# Patient Record
Sex: FEMALE | Race: WHITE | NOT HISPANIC OR LATINO | Employment: FULL TIME | URBAN - METROPOLITAN AREA
[De-identification: names, ages, dates, MRNs, and addresses within clinical notes are randomized per-mention and may not be internally consistent; named-entity substitution may affect disease eponyms.]

---

## 2019-10-23 ENCOUNTER — OFFICE VISIT (OUTPATIENT)
Dept: DERMATOLOGY | Facility: CLINIC | Age: 29
End: 2019-10-23
Payer: COMMERCIAL

## 2019-10-23 VITALS — BODY MASS INDEX: 35.85 KG/M2 | WEIGHT: 228.4 LBS | TEMPERATURE: 98.3 F | HEIGHT: 67 IN

## 2019-10-23 DIAGNOSIS — B07.9 VERRUCA VULGARIS: ICD-10-CM

## 2019-10-23 DIAGNOSIS — L70.0 ACNE VULGARIS: Primary | ICD-10-CM

## 2019-10-23 PROCEDURE — 17110 DESTRUCTION B9 LES UP TO 14: CPT | Performed by: DERMATOLOGY

## 2019-10-23 PROCEDURE — 99202 OFFICE O/P NEW SF 15 MIN: CPT | Performed by: DERMATOLOGY

## 2019-10-23 RX ORDER — CLINDAMYCIN PHOSPHATE 11.9 MG/ML
SOLUTION TOPICAL
Qty: 60 ML | Refills: 6 | Status: SHIPPED | OUTPATIENT
Start: 2019-10-23

## 2019-10-23 NOTE — PROGRESS NOTES
[facility] Note     Patient Name: Leni Sicard  Encounter Date: 10/23/2019    Today's Chief Concerns:   Concern #1:  acne      Past Medical History:  Have you ever had or currently have any of the following medical conditions or treatments? · HIV/AIDS: No  · Hepatitis B: No  · Hepatitis C: No   · Diabetes: No  · Tuberculosis: No  · Biologic Therapy/Chemotherapy: No  · Organ or Bone Marrow Transplantation: No  · Radiation Treatment: No  · Cancer (If Yes, which types)- No      Have you ever had any of the following skin conditions? · Melanoma? (If Yes, please provide more detail)- No  · Basal Cell Carcinoma: No  · Squamous Cell Carcinoma: No  · Sebaceous Cell Carcinoma: No  · Merkel Cell Carcinoma: No  · Angiosarcoma: No  · Blistering Sunburns: No  · Eczema: No  · Psoriasis: No    Social History:    What is your current Smoking Status? Never a smoker    What is/was your primary occupation? What are your hobbies/past-times? Family history:  Do any of your "first degree relatives" (parent, brother, sister, or child) have any of the following conditions? · Melanoma? (If Yes, which relatives?) No  · Eczema: No  · Asthma: No  · Hay Fever/Seasonal Allergies: No  · Psoriasis: No  · Arthritis: No  · Thyroid Problems: No  · Lupus/Connective Tissue Disease: No  · Diabetes: No  · Stroke: No  · Blood Clots: No  · IBD/Crohn's/Ulcerative Colitis: No  · Vitiligo: No  · Scarring/Keloids: No  · Severe Acne: No  · Pancreatic Cancer: No  · Other known Skin Condition? If Yes, what condition and which relatives? No    Current Medications:  No current outpatient medications on file  Specific Alerts:    Have you been seen by a [facility] Dermatologist in the last 3 years? No    Are you pregnant or planning to become pregnant? No    Are you currently or planning to be nursing or breast feeding?  No    Allergies not on file    May we call your Preferred Phone number to discuss your specific medical information? YES    May we leave a detailed message that includes your specific medical information? YES    Have you traveled outside of the [facility] in the past 3 months? No    Do you currently have a pacemaker or defibrillator? No    Do you have any artificial heart valves, joints, plates, screws, rods, stents, pins, etc? No   - If Yes, were any placed within the last 2 years? Do you require any medications prior to a surgical procedure? No   - If Yes, for which procedure? - If Yes, what medications to you require? Are you taking any medications that cause you to bleed more easily ("blood thinners") No    Have you ever experienced a rapid heartbeat with epinephrine? No    Have you ever been treated with "gold" (gold sodium thiomalate) therapy? No    Merged with [facility] can help with wrinkles, "laugh lines," facial volume loss, "double chin," "love handles," age spots, and more  Are you interested in learning today about some of the skin enhancement procedures that we offer? (If Yes, please provide more detail) No    Review of Systems:  Have you recently had or currently have any of the following?     · Fever or chills: No  · Night Sweats: No  · Headaches: No  · Weight Gain: No  · Weight Loss: No  · Blurry Vision: No  · Nausea: No  · Vomiting: No  · Diarrhea: No  · Blood in Stool: No  · Abdominal Pain: No  · Itchy Skin: No  · Painful Joints: No  · Swollen Joints: No  · Muscle Pain: No  · Irregular Mole: No  · Sun Burn: No  · Dry Skin: No  · Skin Color Changes: No  · Scar or Keloid: No  · Cold Sores/Fever Blisters: No  · Bacterial Infections/MRSA: No  · Anxiety: No  · Depression: No  · Suicidal or Homicidal Thoughts: No      PHYSICAL EXAM:      Was a chaperone (Derm Clinical Assistant) present for the entirety of the Physical Exam? YES    Did the Dermatology Team specifically ask and  the patient on the importance of a Full Skin Exam to be sure that nothing is missed clinically? YES    Did the patient request or accept a Full Skin Exam?  No, just the face    Did the patient specifically refuse to have the areas "under-the-bra" examined by the Dermatologist? Starla Rodriguez    Did the patient specifically refuse to have the areas "under-the-underwear" examined by the Dermatologist? YES      CONSTITUTIONAL:   Vitals:    10/23/19 1303   Temp: 98 3 °F (36 8 °C)   Weight: 104 kg (228 lb 6 4 oz)   Height: 5' 7" (1 702 m)           PSYCH: Normal mood and affect  EYES: Normal conjunctiva  ENT: Normal lips and oral mucosa  RESPIRATORY: Normal respirations      FULL ORGAN SYSTEM SKIN EXAM (SKIN)  Hair, Scalp, Ears, Face Normal except as noted below in Assessment   Neck, Cervical Chain Nodes Normal except as noted below in Assessment   Right Arm/Hand/Fingers declined   Left Arm/Hand/Fingers declined   Chest/Breasts/Axillae declined   Abdomen, Umbilicus declined   Back/Spine declined   Groin/Genitalia/Buttocks declined   Right Leg, Foot, Toes declined   Left Leg, Foot, Toes declined        ASSESSMENT AND PLAN BY DIAGNOSIS:    History of Present Condition:     Duration:  How long has this been an issue for you?    o  teens   Location Affected:  Where on the body is this affecting you?    o  face   Quality:  Is there any bleeding, pain, itch, burning/irritation, or redness associated with the skin lesion? o  denies   Severity:  Describe any bleeding, pain, itch, burning/irritation, or redness on a scale of 1 to 10 (with 10 being the worst)  o  n/a   Timing:  Does this condition seem to be there pretty constantly or do you notice it more at specific times throughout the day?     o  worse   Context:  Have you ever noticed that this condition seems to be associated with specific activities you do?    o  denies   Modifying Factors:    o Anything that seems to make the condition worse?    -  after pregnancy   o What have you tried to do to make the condition better?    -  over the counter: [facility]   Associated Signs and Symptoms:  Does this skin lesion seem to be associated with any of the following:  o  denies      1  ACNE VULGARIS ("COMMON ACNE")    Physical Exam:   Psychiatric/Mood: normal   Anatomic Location Affected:  face   Morphological Description:  o Open/Closed Comedones:  - Few ("Mild")  o Inflammatory Papules/Pustules:  - No evidence ("Clear")  o Nodules:  - No evidence ("Clear")  o Scarring:  - Rare ("Almost Clear")  o Excoriations:  - Few ("Mild")  o Local Skin Redness/Erythema:  - Few ("Mild")  o Local Skin Dryness/Scaling:  - No evidence ("Clear")  o Local Skin Dyspigmentation:  - Few ("Mild")       Additional History of Present Condition:  Patient states she only used over the counter medication  Patient denies currently breast feeding  Worsened during pregnancy  Assessment and Plan:   We reviewed the causes of acne, the kinds of acne, and the expected clinical course   We discussed treatment options ranging from over-the-counter products, topical retinoids, antibiotics, BP, hormonal therapies (OCPs/spironolactone), and isotretinoin (Accutane)   We reviewed specific over-the-counter interventions and medications  Recommended typical hygiene measures including water-based facial products, washing regularly with mild cleanser, and refraining from picking and popping any pimples   Recommended non-comedogenic sunscreen use daily   Expectations of therapy discussed  Side effects, risks and benefits of medications discussed   A comprehensive handout on Acne was provided   The phone number to call in case of questions or concerns (and instructions to stop medications in such a scenario) was provided     After lengthy discussion of etiology and treatment options, we decided to implement the following personalized treatment plan:    Based on a thorough discussion of this condition and the management approach to it (including a comprehensive discussion of the known risks, side effects and potential benefits of treatment), the patient (family) agrees to implement the following specific plan:    --------------------------------------------------------------------------------------  YOUR PERSONALIZED ACNE ACTION PLAN    [address] [facility]    1) SKIN HYGIENE:  In the shower, wash your face, chest and back gently with Cetaphil moisturizing cleanser or Dove Fragrance-free bar  Do not use a luffa or washcloth as these tend to be too irritating to acne-prone skin  2) ANTIBIOTICS:       Topical Clindamycin 1% solution after morning face wash    3) Daily Moisturizer     Start using a daily moisturizer like Neutrogena daily defense or Cerave AM 3 times daily for sun protection              EVENING ROUTINE    1) SKIN HYGIENE:  In the shower, wash your face, chest and back gently with Cetaphil moisturizing cleanser or Dove Fragrance-free bar  Do not use a lufa or washcloth as these tend to be too irritating to acne-prone skin  2) ANTIMICROBIAL BENZOYL PEROXIDE:     Neutrogena Clear Pore (Benzoyl Peroxide 3% wash): In the shower, apply this medication to your face, chest and back  Leave this wash on your skin for about 5 minutes and then rinse it off completely while in the shower  If you do not rinse it off completely, then it will bleach your towels or clothing  This medication is now available without prescription (over-the-counter) in most drug stores or at [employer] for about $7 a bottle  3)ANTIBIOTICS:     clindamycin      4)TOPICAL RETINOID:  At 1 hour before bedtime (after washing your face and allowing the skin to completely dry), spread only a single pea-sized amount of this medication evenly over your entire face (avoiding your eyes or mouth): Adapalene 0 1% 1 hour before bedtime          5)ORAL CONTRACEPTIVE PILL:  None    ORAL ISOTRETINOIN:    None        REMEMBER:  Always take your acne pills with lots of water!   A pill stuck in your throat can cause significant burning and irritation  Drink a full glass of water to ensure the pill gets into your stomach  Avoid popping a pill right before bed, and stay upright for at least 1 hour after taking a pill  ACNE:  WHAT ZIT ALL ABOUT? WHY DO I HAVE ACNE/PIMPLES? Your skin is made of layers  To keep the skin from becoming dry and cracked, the skin needs oil  The oil is made in little wells in the deeper layers in the skin  People with acne have glands that make more oil and are more easily plugged, causing the glands to swell  Hormones, bacteria and your inherited tendency to have acne all play a role  The medical term for pimples is acne or acne vulgaris (vulgaris means common)  Most people get some acne  Acne does not come from being dirty  Instead, it is an expected consequence of changes that occur during normal growth and development  Hormones, bacteria, and your family's tendency to have acne may all play a role  Whiteheads or blackheads are openings of the glands (glands are the oil factories) onto the surface of the skin  Blackheads are not caused by dirt blocking the pores; instead, they result from the oxidation reaction of oil and skin in the pores with the air (like a rust reaction)  WHAT ABOUT STRESS? Stress does not cause acne but it can make it worse  Make sure you get enough sleep and daily exercise! WHAT ABOUT FOODS/DIET? Try to eat a balanced, healthy diet  Some people feel that certain foods worsen their acne  While there aren't many studies available on this question, severe dietary changes are unlikely to help your acne and may be harmful to the health of your skin  If you find that a certain food seems to aggravate your acne, you may consider avoiding that food  Discuss this with your physician! WHAT CAUSES MY ACNE?   There are four contributors to acne--the body's natural oil (sebum), clogged pores, bacteria (with the scientific name Propionibacterium acnes, or P  acnes, for short), and the body's reaction to the bacteria living in the clogged pores (which causes inflammation)  Here's what happens:     Sebum is produced in the normal oil-making glands in the deeper layers of the skin and reaches the surface through the skin's pores  An increase in certain hormones occurs around the time of puberty, and these hormones trigger the oil glands to produce increased amounts of sebum   Pores with excess oil tend to become clogged more easily   At the same time, P  acnes--one of the many types of bacteria that normally live on everyone's skin--thrives in the excess oil and causes a skin reaction (inflammation)   If a pore is clogged close to the surface, there is little inflammation  However, this results in the formation of whiteheads (closed comedones) or blackheads (open comedones) at the surface of the skin   A plug that extends to, or forms a little deeper in the pore, or one that enlarges or ruptures may cause more inflammation  The result is red bumps (papules) and pus-filled pimples (pustules)   If plugging happens in the deepest skin layer, the inflammation may be even more severe, resulting in the formation of nodules or cysts  When these types of acne heal, they may leave behind discolored areas or true scars  SKIN HYGIENE:  HOW SHOULD I [address] MY SKIN? Acne does not come from being dirty, however, washing your face is part of taking good care of your skin and will help keep your face clear  Good skin hygiene is, therefore, critical to support any acne treatment plan  Here are several specific suggestions for practicing good skin hygiene and keeping your skin looking its best:     You should wash acne-prone skin TWICE A DAY: Once in the morning and once in the evening  This does include any showers you take that day, so do not overdo it!    Do not scrub the skin with a washcloth or loofah as these can irritate and inflame your acne  Acne does not come from dirt, so it is not necessary to scrub the skin clean  In fact, scrubbing may lead to dryness and irritation that makes the acne even worse and harder for patients to tolerate acne medications   Use a gentle facial moisturizing cleanser (Cetaphil Moisturizing Cleanser or Dove Fragrance-Free bar)  Avoid using soaps like Carlos Torres, [address], [address], or soft/liquid soaps as these products will dry your skin   Do not use any over-the-counter acne washes without your doctor's specific instruction to do so  These products often contain salicylic acid or benzoyl peroxide  These ingredients can be helpful in clearing oil from the skin and reducing bacteria, but they may also be drying and can add to irritation   Do not use exfoliating products with microbeads or brushes as these can cause irritation to the skin   Facials and other treatments to remove, squeeze, or clean out pores are not recommended  Manipulating the skin in this way can make acne worse and can lead to severe infections and/or scarring  It also increases the likelihood that the skin will not be able to tolerate acne medications   Try not to pop pimples or pick at your acne as this can delay healing and may result in scarring or skin color changes (dark spots) that are often more noticeable than the acne itself  Picking/popping acne can also cause a serious skin infection   Wash or change your pillow case once to twice a week, especially if you use products in your hair   Wash the skin as soon as possible after playing sports or other activities that cause a lot of sweating  Also, pay attention to how your sports equipment (shoulder pads, helmet strap, etc ) might be making your acne worse   When you use makeup, moisturizer, or sunscreen make sure that these products are labeled non-comedogenic, or won't clog pores, or won't cause acne         SHOULD I TREAT MY ACNE?     There are a number of other skin conditions that can look like acne  If there is any question about the diagnosis, then the person should be evaluated by a board certified pediatric and adolescent dermatologist   A physician should examine any child with acne who is between the ages of 3and 9years of age, as acne in this mid-childhood age group is not normal and may signal an underlying problem  If a preadolescent (9to 6years of age) or adolescent (15to 25years of age) has mild acne and the condition is not bothersome to the individual, proper and regular skin care (what your doctor may call skin hygiene) may be all that is needed at this point  Many people do, however, need specific acne medications to help their skin look and feel its best  Your doctor will tell you if you are one of these people  If so, you may be advised to use an over-the-counter or prescription medication that is applied to the skin (a topical medication) or if the addition of an oral medication (a medication taken by [employer]) is needed  The good news is that the medications work well when used properly! Some specific factors that may influence the choice of acne therapy include:     Severity  The number and type of skin lesions (papules or comedones) and the degree of inflammation (mild, moderate or severe)   Scarring  Scarring is most common when acne is severe, but it can happen even in children with mild acne   Impact  If a child is experiencing emotional complications because of the acne or is experiencing negative comments from other children   Cost of the acne medications  An acne expert can help to keep out of pocket costs to a minimum by utilizing the correct medications and the least expensive options   The patient's skin type (oily versus dry or combination skin, for example)   Potential side effects of the medication   The ease or overall complexity of the treatment plan or medication      WHAT ACNE TREATMENTS ARE AVAILABLE? Medications for acne try to stop the formation of new pimples by reducing or removing the oil, bacteria, and other things (like dead skin cells) that clog the pores  They can also decrease the inflammation or irritation response of the skin to bacteria  It may take from 6 to 8 weeks (about 2 months!) before you see any improvement and know if the medication is effective  It takes the layers of skin this long to regenerate  Remember, these medications do not cure the condition--the acne improves because of the medication  Therefore, treatment must be continued in order to prevent the return of acne lesions  There are many types of acne treatments  Some are applied to the skin (topical medications) and some are taken by mouth (oral medications)  In most cases of mild acne, the doctor will start with a topical medication  There are many different topical medications that are helpful for acne  If acne is more severe and it does not respond adequately to a topical medication, or if it covers large body surface areas such as the back and/or chest, oral antibiotics such as Doxycycline or Minocycline and/or oral hormone therapy such as Oral Contraceptive Pills or Spironolactone may be prescribed  In the most severe cases, isotretinoin (Accutane) may be used  In general, it is usually best to start with acne medications that are least likely to cause side effects but are at the same time capable of addressing the specific causes for the acne  Some patients have a good result with just one medication, but many will need to use a combination of treatments: two or more different topical agents or an oral medication plus a topical medication  Another treatment used for acne may include corticosteroid injections, which are used to help relieve pain, decrease the size, and encourage the healing of large, inflamed acne nodules   Also, dermatologists sometimes perform Mark Paez using a fine needle, a pointed blade, or an instrument known as a comedone extractor to mechanically clean out clogged pores  One must always weigh the risk for inducing a scar with the potential benefits of any procedure  Prior treatment with topical retinoids can loosen whiteheads and blackheads and make it easier to physically remove such lesions  Heat-based devices, and light and laser therapy are being studied to see whether there is any role for such treatments in mild to moderate acne  At this time, there is not enough evidence to make general recommendations about their use  TOPICAL ACNE MEDICATIONS    WHAT KIND OF TOPICALS ARE THERE?  Benzoyl peroxide (BP) helps to fight inflammation and is anti-microbial (kills bacteria, viruses, and other microorganisms) and is believed to help prevent resistance of bacteria to topical antibiotics  A benzoyl peroxide wash may be recommended for use on large areas such as the chest and/or back  Mild irritation and dryness are common when first using benzoyl peroxide-containing products  Be careful because benzoyl peroxide can bleach towels and clothing!  Retinoids (such as adapalene, tretinoin, or tazarotene) unplug the oil glands by helping peel away the layers of skin and other things plugging the opening of the glands  Mild irritation and dryness are common when first using these products  Facial waxing and other skin procedures can lead to excessive irritation and should be avoided during retinoid therapy   Antibiotics fight bacteria and help decrease inflammation  Topical antibiotics commonly used in acne include clindamycin, erythromycin, and combination agents (such as clindamycin/benzoyl peroxide or erythromycin/benzoyl peroxide)  Mild irritation and dryness are common when first using these products  Typically, topical antibiotics should not be used alone as treatment for acne     Other topical agents include salicylic acid, azelaic acid, dapsone, and sulfacetamide  Mild irritation and dryness can also occur when first using these products  USING YOUR TOPICAL TREATMENTS LIKE A PRO   Apply topical medications only to clean, dry skin  Topical medications may lead to significant dryness of the affected areas  To minimize this, wait 15-20 minutes after washing before applying your topical medication   These medications work deep in the skin to prevent new breakouts  Spot treatment of individual pimples does not do much  When applying topical medications to the face, use the 5-dot method  Start by placing a small pea-sized amount of the medication on your finger  Then, place dots in each of five locations of your face: Mid-forehead, each cheek, nose, and chin  Next, rub the medication into the entire area of skin - not just on individual pimples! Try to avoid the delicate skin around your eyes and corners of your mouth   The medications are not magic! They take weeks if not months to work  Be patient and use your medicine on a daily basis or as directed for six weeks before asking if your skin looks better  Try not to miss more than one or two days each week when using your medications   If you are starting a new medication, then try using it every other night or even every third night   Gradually work up to [employer] a day    This will give your skin time to adjust    The same medications often come in various forms or formulations: Creams, ointments, lotions, gels, microspheres, or foams  Use the formulation that has been recommended and don't switch to other forms unless instructed  Some forms (such as alcohol based gels) may be more drying and less tolerable for certain skin types   Sometimes individual medications are not as effective as a combination of two or more agents  The doctor may need to try several medications or combinations before finding the one that is best for that patient     Moisturizer, sunscreen, and make-up may be used in conjunction with topical acne medications  In general, acne medications are applied first so they may directly contact the skin  Ask your physician to review specific application instructions!  It is especially important to always use sunscreen when using a topical retinoid or oral antibiotic  These drugs can make your skin more sensitive to the sun  In general, sunscreen gets applied AFTER any acne medications   Don't stop using your acne medications just because your acne got better  Remember, the acne is better because of the medication, and prevention is the mike to treatment  ORAL ACNE MEDICATIONS    ORAL ANTIBIOTICS  Antibiotics include tetracycline-class medicines (which include the most commonly used oral antibiotics for acne, minocycline, and doxycycline), erythromycin, trimethoprim-sulfamethoxazole, and occasionally cephalexin or azithromycin  These drugs may decrease bacteria and inflammation, and they are most effective for moderate-to-severe inflammatory acne  A product containing benzoyl peroxide should be used along with these antibiotics to help decrease the possibility of microbial resistance  Always take your acne pills with lots of water! A pill stuck in your throat can cause significant burning and irritation  Drink a full glass of water to ensure the pill gets into your stomach  Avoid popping a pill right before bed, and stay upright for at least 1 hour after taking a pill  DOXYCYCLINE   This medication is usually taken ONCE or TWICE per day, as instructed by your physician  NOTE: Always take this medication with lots of water! A pill stuck in the throat can cause significant burning and irritation  Avoid popping a pill right before bed & stay upright for at least one hour after taking a pill  WARNING: Doxycycline increases your sensitivity to the sun, so practice excellent sun protection!  If you notice any of the following, stop using the medication and notify your health care provider: headaches; blurred vision; dizziness; sun sensitivity; heartburn-stomach pain; irritation of the esophagus; darkening of scars, gums, or teeth (more often with minocycline); nail changes; yellowing of the eyes or skin (indicating possible liver disease); joint pains-and flu-like symptoms  Taking oral antibiotics with food may help with symptoms of upset stomach  COMMON SIDE EFFECTS: Headaches; dizziness; sun sensitivity; irritation of the throat; discoloration of scars, gums, or teeth; nail changes  MINOCYCLINE   This medication is usually taken ONCE or TWICE per day, as instructed by your physician  NOTE: Always take this medication with lots of water! A pill stuck in the throat can cause significant burning and irritation  Avoid popping a pill right before bed & stay upright for at least one hour after taking a pill  WARNING: Though less likely than doxycycline, minocycline may increase your sensitivity to the sun, so practice excellent sun protection! If you notice any of the following, stop using the medication and notify your health care provider: headaches; blurred vision; dizziness; sun sensitivity; heartburn-stomach pain; irritation of the throat; darkening of scars, gums, or teeth; nail changes; yellowing of the eyes or skin (indicating possible liver disease); joint pains-and flu-like symptoms  Taking oral antibiotics with food may help with symptoms of upset stomach  COMMON SIDE EFFECTS: Headaches; dizziness; sun sensitivity; irritation of the throat; discoloration of scars, gums, or teeth (often with minocycline); nail changes  Minocycline can rarely cause liver disease, joint pains, severe skin rashes, and flu-like symptoms  If you should notice yellowing of the eyes or skin, or any of the above, notify your doctor and stop using the medication immediately       HORMONAL THERAPY  Hormonal treatment is used only in females and usually consists of oral contraceptives (birth control pills)  Spironolactone is also sometimes used  ORAL CONTRACEPTIVE PILLS   This medication is also known as the Birth Control Pill    We use it for hormonal regulation of acne  Take this medication as directed on the medication packet  NOTE: Try to find a regular time in your day to take the pill so that you don't forget  The best time is about half an hour after a meal or snack, or at bedtime  If you do forget to take your daily pill at the regular time, take one as soon as you remember and take the next at your regular scheduled time  WARNING: Do not take this medication until discussing it with your physician if you smoke, are pregnant (or trying to become pregnant or could be pregnant), have a personal history of breast cancer, have any artificial hardware or implants, have a condition called Factor 5 Leiden deficiency, have a family history of clotting problems, regularly have migraine headaches (especially with aura or due to flashing lights), or have any vaginal bleeding other than that associated with your menstrual cycle  ORAL ISOTRETINOIN (used to be called the brand name Vernia Learn)  Isotretinoin, a derivative of vitamin A, is a powerful drug with several significant potential side effects  It is reserved for acne which is severe or when other medications have not worked well enough  It used to be sold under the brand name Accutane but now several versions exist       HAVING PROBLEMS WITH ANY OF YOUR TREATMENTS? You should not be able to see any of the medicines on your face  If you can see a white film on your skin after you apply the medication, there is too much medicine in that area and you need to apply a thinner coat and make sure it is spread evenly on your face  If your skin gets too dry, you can apply a light (non-comedogenic) moisturizer on top of your medicine or you may switch to using the medicine every other day instead of every day    If your skin is still too irritated, you may need to switch to a milder medication  If your skin is red and very itchy, you may be allergic to the medication and you should stop using it  COMMON POSSIBLE SIDE EFFECTS OF MEDICATIONS     Retinoids - dryness, redness, increased sun sensitivity   Benzoyl peroxide - drying, redness, bleaching of clothes, towels and sheets, allergy   Doxycycline - headaches; dizziness; irritation of the throat; nail changes; discoloration of teeth   Sun sensitivity - even if you have dark skin, this medicine can make you burn more easily  Make sure you protect yourself from the sun, either by avoiding being outside between 11 AM and 3 PM, wearing and reapplying sunscreen/sunblock, or wearing sun protective clothing   Nausea/vomiting - if you experience nausea with this medication, take it with food   Minocycline - headaches; dizziness; vision problems,  irritation of the throat; discoloration of scars, gums, or teeth  Can rarely cause liver disease, joint pains, and flu-like symptoms   If you should notice yellowing of the skin or any of the above, notify your doctor and stop using the medication   Birth Control Pills - nausea; headaches; breast tenderness; feeling bloated; mood changes   Spotting between periods may occur for the first three weeks of the medication, but this is not serious  It may last for two or three cycles  Please call us if the bleeding is heavier than a light flow or lasts for more than a few days  WHEN AND WHERE TO CALL WITH CONCERNS  We are here to help! If you experience any unusual symptoms, then stop taking or using the medication and call our office at [phone] (SKIN)  It is better to be safe than to be sorry! 2   VERRUCA VULGARIS ("Common Warts")    Physical Exam:   Anatomic Location Affected:  Left Hindu   Morphological Description:  2 brown verrucous papules       Additional History of Present Condition: Patient denies previous treatment     Assessment and Plan:  Based on a thorough discussion of this condition and the management approach to it (including a comprehensive discussion of the known risks, side effects and potential benefits of treatment), the patient (family) agrees to implement the following specific plan:   Cryotherapy today with forceps     Verruca Vulgaris  A verruca is a common growth of the skin caused by infection by human papilloma virus (HPV)  There are many strains of the virus that cause different types of warts on the body  The virus infects the most superficial layers of the skin, causing increased production of skin cells and thickening  Warts can be spread through direct contact with infected skin and may spread to other parts of the body if scratched or picked  A verruca is more commonly called a "wart " Warts are particularly common in school-aged children but can arise at any age  Patients who have a history of eczema are especially prone due to impaired skin barrier  Those taking immunosuppressive drugs or with HIV infections may experience prolonged symptoms despite treatment  Warts generally have a rough surface with a tiny black dot sometimes observed in the middle of each scaly spot  They can range in size from a small bump to large scaly growths  Common warts are often found on the backs of fingers or toes, around the nails, and on the knees  Plantar warts can grow inwardly on the soles of the feet causing pain  There are many possible ways to treat warts and sometimes several different treatments are needed to get the warts to go away completely  There is no single perfect treatment for warts, and successful treatment can take many months  In-office treatments usually require multiple visits, and include:  1) Cryotherapy  a cold spray with liquid nitrogen will destroy the infected cells but may lead to discomfort and blistering   It may also leave a permanent white francis or scar  2) Electrosurgery (curettage and cautery) can be used for large resistant warts which involves shaving the growth down and burning the base  It is performed under local anesthesia and may leave a permanent scar    3) Candida (yeast) antigen injections  These are extracts of the common yeast (Candida) that cannot cause an infection  The medication is injected into/under the wart  It is thought to stimulate the immune system to recognize the wart virus and attack it  Multiple injections are often needed about one month apart  There are also several at-home wart treatments:    1) Soak the warts in warm water for 5 minutes every night followed by gentle filing with a nail file or pumice stone  2) Topical salicylic acid or similar compounds work by removing the dead surface skin cells  a  Apply the medicine directly to the wart, wait for it to dry completely, then cover with duct tape overnight   b  Repeat until the wart is gone, which can take 2-4 months  c  Do not use on the face or groin area   d  If the wart paint makes the skin sore, stop treatment until the discomfort has settled, then recommence as above   e  Take care to keep the chemical off normal skin  3) Podophyllin is a cytotoxic agent used in some products and must not be used in pregnancy or women considering pregnancy  4) Some prescription medications include   a  Topical retinoids (adapalene, tretinoin, tazarotene), 5-fluorouracil (Efudex) or imiquimod (Aldara) creams are sometimes used to treat flat warts or warts on the face and other sensitive anatomical areas  They are usually applied directly to the warts once a day for 2-4 months and can be irritating  These treatments should only be used as directed by your health care provider  b  Systemic treatment with oral cimetidine (Tagamet) may help boost the immune system against the wart virus in patients, some of the time    Initiation of cimetidine therapy should ONLY be done under the supervision of your health care provider, who can discuss possible side effects and drug-to-drug interactions of this specific treatment  PROCEDURE:  DESTRUCTION OF BENIGN LESIONS  After a thorough discussion of treatment options and risk/benefits/alternatives (including but not limited to local pain, scarring, dyspigmentation, blistering, and possible superinfection), verbal and written consent were obtained and the aforementioned lesions were treated on with cryotherapy using liquid nitrogen x 3 cycle for 5-10 seconds   TOTAL NUMBER of 2 lesions were treated today on the ANATOMIC LOCATION: left temple  The patient tolerated the procedure well, and after-care instructions were provided

## 2019-10-23 NOTE — PATIENT INSTRUCTIONS
YOUR PERSONALIZED ACNE ACTION PLAN    [address] [facility]    1) SKIN HYGIENE:  In the shower, wash your face, chest and back gently with Cetaphil moisturizing cleanser or Dove Fragrance-free bar  Do not use a luffa or washcloth as these tend to be too irritating to acne-prone skin  2) ANTIBIOTICS:       Topical Clindamycin 1% solution after morning face wash    3) Daily Moisturizer     Start using a daily moisturizer like Neutrogena daily defense or Cerave AM 3 times daily for sun protection              EVENING ROUTINE    1) SKIN HYGIENE:  In the shower, wash your face, chest and back gently with Cetaphil moisturizing cleanser or Dove Fragrance-free bar  Do not use a lufa or washcloth as these tend to be too irritating to acne-prone skin  2) ANTIMICROBIAL BENZOYL PEROXIDE:     Neutrogena Clear Pore (Benzoyl Peroxide 3% wash): In the shower, apply this medication to your face, chest and back  Leave this wash on your skin for about 5 minutes and then rinse it off completely while in the shower  If you do not rinse it off completely, then it will bleach your towels or clothing  This medication is now available without prescription (over-the-counter) in most drug stores or at [employer] for about $7 a bottle  3)ANTIBIOTICS:     None      4)TOPICAL RETINOID:  At 1 hour before bedtime (after washing your face and allowing the skin to completely dry), spread only a single pea-sized amount of this medication evenly over your entire face (avoiding your eyes or mouth): Adapalene 0 1% 1 hour before bedtime          5)ORAL CONTRACEPTIVE PILL:  None    ORAL ISOTRETINOIN:    None        REMEMBER:  Always take your acne pills with lots of water! A pill stuck in your throat can cause significant burning and irritation  Drink a full glass of water to ensure the pill gets into your stomach    Avoid popping a pill right before bed, and stay upright for at least 1 hour after taking a pill       ACNE:  WHAT ZIT ALL ABOUT? WHY DO I HAVE ACNE/PIMPLES? Your skin is made of layers  To keep the skin from becoming dry and cracked, the skin needs oil  The oil is made in little wells in the deeper layers in the skin  People with acne have glands that make more oil and are more easily plugged, causing the glands to swell  Hormones, bacteria and your inherited tendency to have acne all play a role  The medical term for pimples is acne or acne vulgaris (vulgaris means common)  Most people get some acne  Acne does not come from being dirty  Instead, it is an expected consequence of changes that occur during normal growth and development  Hormones, bacteria, and your family's tendency to have acne may all play a role  Whiteheads or blackheads are openings of the glands (glands are the oil factories) onto the surface of the skin  Blackheads are not caused by dirt blocking the pores; instead, they result from the oxidation reaction of oil and skin in the pores with the air (like a rust reaction)  WHAT ABOUT STRESS? Stress does not cause acne but it can make it worse  Make sure you get enough sleep and daily exercise! WHAT ABOUT FOODS/DIET? Try to eat a balanced, healthy diet  Some people feel that certain foods worsen their acne  While there aren't many studies available on this question, severe dietary changes are unlikely to help your acne and may be harmful to the health of your skin  If you find that a certain food seems to aggravate your acne, you may consider avoiding that food  Discuss this with your physician! WHAT CAUSES MY ACNE? There are four contributors to acne--the body's natural oil (sebum), clogged pores, bacteria (with the scientific name Propionibacterium acnes, or P  acnes, for short), and the body's reaction to the bacteria living in the clogged pores (which causes inflammation)   Here's what happens:     Sebum is produced in the normal oil-making glands in the deeper layers of the skin and reaches the surface through the skin's pores  An increase in certain hormones occurs around the time of puberty, and these hormones trigger the oil glands to produce increased amounts of sebum   Pores with excess oil tend to become clogged more easily   At the same time, P  acnes--one of the many types of bacteria that normally live on everyone's skin--thrives in the excess oil and causes a skin reaction (inflammation)   If a pore is clogged close to the surface, there is little inflammation  However, this results in the formation of whiteheads (closed comedones) or blackheads (open comedones) at the surface of the skin   A plug that extends to, or forms a little deeper in the pore, or one that enlarges or ruptures may cause more inflammation  The result is red bumps (papules) and pus-filled pimples (pustules)   If plugging happens in the deepest skin layer, the inflammation may be even more severe, resulting in the formation of nodules or cysts  When these types of acne heal, they may leave behind discolored areas or true scars  SKIN HYGIENE:  HOW SHOULD I [address] MY SKIN? Acne does not come from being dirty, however, washing your face is part of taking good care of your skin and will help keep your face clear  Good skin hygiene is, therefore, critical to support any acne treatment plan  Here are several specific suggestions for practicing good skin hygiene and keeping your skin looking its best:     You should wash acne-prone skin TWICE A DAY: Once in the morning and once in the evening  This does include any showers you take that day, so do not overdo it!  Do not scrub the skin with a washcloth or loofah as these can irritate and inflame your acne  Acne does not come from dirt, so it is not necessary to scrub the skin clean   In fact, scrubbing may lead to dryness and irritation that makes the acne even worse and harder for patients to tolerate acne medications   Use a gentle facial moisturizing cleanser (Cetaphil Moisturizing Cleanser or Dove Fragrance-Free bar)  Avoid using soaps like [address], [address], or soft/liquid soaps as these products will dry your skin   Do not use any over-the-counter acne washes without your doctor's specific instruction to do so  These products often contain salicylic acid or benzoyl peroxide  These ingredients can be helpful in clearing oil from the skin and reducing bacteria, but they may also be drying and can add to irritation   Do not use exfoliating products with microbeads or brushes as these can cause irritation to the skin   Facials and other treatments to remove, squeeze, or clean out pores are not recommended  Manipulating the skin in this way can make acne worse and can lead to severe infections and/or scarring  It also increases the likelihood that the skin will not be able to tolerate acne medications   Try not to pop pimples or pick at your acne as this can delay healing and may result in scarring or skin color changes (dark spots) that are often more noticeable than the acne itself  Picking/popping acne can also cause a serious skin infection   Wash or change your pillow case once to twice a week, especially if you use products in your hair   Wash the skin as soon as possible after playing sports or other activities that cause a lot of sweating  Also, pay attention to how your sports equipment (shoulder pads, helmet strap, etc ) might be making your acne worse   When you use makeup, moisturizer, or sunscreen make sure that these products are labeled non-comedogenic, or won't clog pores, or won't cause acne         SHOULD I TREAT MY ACNE? There are a number of other skin conditions that can look like acne   If there is any question about the diagnosis, then the person should be evaluated by a board certified pediatric and adolescent dermatologist   A physician should examine any child with acne who is between the ages of 3and 9years of age, as acne in this mid-childhood age group is not normal and may signal an underlying problem  If a preadolescent (9to 6years of age) or adolescent (15to 25years of age) has mild acne and the condition is not bothersome to the individual, proper and regular skin care (what your doctor may call skin hygiene) may be all that is needed at this point  Many people do, however, need specific acne medications to help their skin look and feel its best  Your doctor will tell you if you are one of these people  If so, you may be advised to use an over-the-counter or prescription medication that is applied to the skin (a topical medication) or if the addition of an oral medication (a medication taken by [employer]) is needed  The good news is that the medications work well when used properly! Some specific factors that may influence the choice of acne therapy include:     Severity  The number and type of skin lesions (papules or comedones) and the degree of inflammation (mild, moderate or severe)   Scarring  Scarring is most common when acne is severe, but it can happen even in children with mild acne   Impact  If a child is experiencing emotional complications because of the acne or is experiencing negative comments from other children   Cost of the acne medications  An acne expert can help to keep out of pocket costs to a minimum by utilizing the correct medications and the least expensive options   The patient's skin type (oily versus dry or combination skin, for example)   Potential side effects of the medication   The ease or overall complexity of the treatment plan or medication  WHAT ACNE TREATMENTS ARE AVAILABLE? Medications for acne try to stop the formation of new pimples by reducing or removing the oil, bacteria, and other things (like dead skin cells) that clog the pores   They can also decrease the inflammation or irritation response of the skin to bacteria  It may take from 6 to 8 weeks (about 2 months!) before you see any improvement and know if the medication is effective  It takes the layers of skin this long to regenerate  Remember, these medications do not cure the condition--the acne improves because of the medication  Therefore, treatment must be continued in order to prevent the return of acne lesions  There are many types of acne treatments  Some are applied to the skin (topical medications) and some are taken by mouth (oral medications)  In most cases of mild acne, the doctor will start with a topical medication  There are many different topical medications that are helpful for acne  If acne is more severe and it does not respond adequately to a topical medication, or if it covers large body surface areas such as the back and/or chest, oral antibiotics such as Doxycycline or Minocycline and/or oral hormone therapy such as Oral Contraceptive Pills or Spironolactone may be prescribed  In the most severe cases, isotretinoin (Accutane) may be used  In general, it is usually best to start with acne medications that are least likely to cause side effects but are at the same time capable of addressing the specific causes for the acne  Some patients have a good result with just one medication, but many will need to use a combination of treatments: two or more different topical agents or an oral medication plus a topical medication  Another treatment used for acne may include corticosteroid injections, which are used to help relieve pain, decrease the size, and encourage the healing of large, inflamed acne nodules  Also, dermatologists sometimes perform acne surgery, using a fine needle, a pointed blade, or an instrument known as a comedone extractor to mechanically clean out clogged pores   One must always weigh the risk for inducing a scar with the potential benefits of any procedure  Prior treatment with topical retinoids can loosen whiteheads and blackheads and make it easier to physically remove such lesions  Heat-based devices, and light and laser therapy are being studied to see whether there is any role for such treatments in mild to moderate acne  At this time, there is not enough evidence to make general recommendations about their use  TOPICAL ACNE MEDICATIONS    WHAT KIND OF TOPICALS ARE THERE?  Benzoyl peroxide (BP) helps to fight inflammation and is anti-microbial (kills bacteria, viruses, and other microorganisms) and is believed to help prevent resistance of bacteria to topical antibiotics  A benzoyl peroxide wash may be recommended for use on large areas such as the chest and/or back  Mild irritation and dryness are common when first using benzoyl peroxide-containing products  Be careful because benzoyl peroxide can bleach towels and clothing!  Retinoids (such as adapalene, tretinoin, or tazarotene) unplug the oil glands by helping peel away the layers of skin and other things plugging the opening of the glands  Mild irritation and dryness are common when first using these products  Facial waxing and other skin procedures can lead to excessive irritation and should be avoided during retinoid therapy   Antibiotics fight bacteria and help decrease inflammation  Topical antibiotics commonly used in acne include clindamycin, erythromycin, and combination agents (such as clindamycin/benzoyl peroxide or erythromycin/benzoyl peroxide)  Mild irritation and dryness are common when first using these products  Typically, topical antibiotics should not be used alone as treatment for acne   Other topical agents include salicylic acid, azelaic acid, dapsone, and sulfacetamide  Mild irritation and dryness can also occur when first using these products  USING YOUR TOPICAL TREATMENTS LIKE A PRO   Apply topical medications only to clean, dry skin   Topical medications may lead to significant dryness of the affected areas  To minimize this, wait 15-20 minutes after washing before applying your topical medication   These medications work deep in the skin to prevent new breakouts  Spot treatment of individual pimples does not do much  When applying topical medications to the face, use the 5-dot method  Start by placing a small pea-sized amount of the medication on your finger  Then, place dots in each of five locations of your face: Mid-forehead, each cheek, nose, and chin  Next, rub the medication into the entire area of skin - not just on individual pimples! Try to avoid the delicate skin around your eyes and corners of your mouth   The medications are not magic! They take weeks if not months to work  Be patient and use your medicine on a daily basis or as directed for six weeks before asking if your skin looks better  Try not to miss more than one or two days each week when using your medications   If you are starting a new medication, then try using it every other night or even every third night   Gradually work up to [employer] a day    This will give your skin time to adjust    The same medications often come in various forms or formulations: Creams, ointments, lotions, gels, microspheres, or foams  Use the formulation that has been recommended and don't switch to other forms unless instructed  Some forms (such as alcohol based gels) may be more drying and less tolerable for certain skin types   Sometimes individual medications are not as effective as a combination of two or more agents  The doctor may need to try several medications or combinations before finding the one that is best for that patient   Moisturizer, sunscreen, and make-up may be used in conjunction with topical acne medications  In general, acne medications are applied first so they may directly contact the skin  Ask your physician to review specific application instructions!    It is especially important to always use sunscreen when using a topical retinoid or oral antibiotic  These drugs can make your skin more sensitive to the sun  In general, sunscreen gets applied AFTER any acne medications   Don't stop using your acne medications just because your acne got better  Remember, the acne is better because of the medication, and prevention is the mike to treatment  ORAL ACNE MEDICATIONS    ORAL ANTIBIOTICS  Antibiotics include tetracycline-class medicines (which include the most commonly used oral antibiotics for acne, minocycline, and doxycycline), erythromycin, trimethoprim-sulfamethoxazole, and occasionally cephalexin or azithromycin  These drugs may decrease bacteria and inflammation, and they are most effective for moderate-to-severe inflammatory acne  A product containing benzoyl peroxide should be used along with these antibiotics to help decrease the possibility of microbial resistance  Always take your acne pills with lots of water! A pill stuck in your throat can cause significant burning and irritation  Drink a full glass of water to ensure the pill gets into your stomach  Avoid popping a pill right before bed, and stay upright for at least 1 hour after taking a pill  DOXYCYCLINE   This medication is usually taken ONCE or TWICE per day, as instructed by your physician  NOTE: Always take this medication with lots of water! A pill stuck in the throat can cause significant burning and irritation  Avoid popping a pill right before bed & stay upright for at least one hour after taking a pill  WARNING: Doxycycline increases your sensitivity to the sun, so practice excellent sun protection!  If you notice any of the following, stop using the medication and notify your health care provider: headaches; blurred vision; dizziness; sun sensitivity; heartburn-stomach pain; irritation of the esophagus; darkening of scars, gums, or teeth (more often with minocycline); nail changes; yellowing of the eyes or skin (indicating possible liver disease); joint pains-and flu-like symptoms  Taking oral antibiotics with food may help with symptoms of upset stomach  COMMON SIDE EFFECTS: Headaches; dizziness; sun sensitivity; irritation of the throat; discoloration of scars, gums, or teeth; nail changes  MINOCYCLINE   This medication is usually taken ONCE or TWICE per day, as instructed by your physician  NOTE: Always take this medication with lots of water! A pill stuck in the throat can cause significant burning and irritation  Avoid popping a pill right before bed & stay upright for at least one hour after taking a pill  WARNING: Though less likely than doxycycline, minocycline may increase your sensitivity to the sun, so practice excellent sun protection! If you notice any of the following, stop using the medication and notify your health care provider: headaches; blurred vision; dizziness; sun sensitivity; heartburn-stomach pain; irritation of the throat; darkening of scars, gums, or teeth; nail changes; yellowing of the eyes or skin (indicating possible liver disease); joint pains-and flu-like symptoms  Taking oral antibiotics with food may help with symptoms of upset stomach  COMMON SIDE EFFECTS: Headaches; dizziness; sun sensitivity; irritation of the throat; discoloration of scars, gums, or teeth (often with minocycline); nail changes  Minocycline can rarely cause liver disease, joint pains, severe skin rashes, and flu-like symptoms  If you should notice yellowing of the eyes or skin, or any of the above, notify your doctor and stop using the medication immediately  HORMONAL THERAPY  Hormonal treatment is used only in females and usually consists of oral contraceptives (birth control pills)  Spironolactone is also sometimes used  ORAL CONTRACEPTIVE PILLS   This medication is also known as the Birth Control Pill    We use it for hormonal regulation of acne    Take this medication as directed on the medication packet  NOTE: Try to find a regular time in your day to take the pill so that you don't forget  The best time is about half an hour after a meal or snack, or at bedtime  If you do forget to take your daily pill at the regular time, take one as soon as you remember and take the next at your regular scheduled time  WARNING: Do not take this medication until discussing it with your physician if you smoke, are pregnant (or trying to become pregnant or could be pregnant), have a personal history of breast cancer, have any artificial hardware or implants, have a condition called Factor 5 Leiden deficiency, have a family history of clotting problems, regularly have migraine headaches (especially with aura or due to flashing lights), or have any vaginal bleeding other than that associated with your menstrual cycle  ORAL ISOTRETINOIN (used to be called the brand name [place] Brucneo)  Isotretinoin, a derivative of vitamin A, is a powerful drug with several significant potential side effects  It is reserved for acne which is severe or when other medications have not worked well enough  It used to be sold under the brand name Accutane but now several versions exist       HAVING PROBLEMS WITH ANY OF YOUR TREATMENTS? You should not be able to see any of the medicines on your face  If you can see a white film on your skin after you apply the medication, there is too much medicine in that area and you need to apply a thinner coat and make sure it is spread evenly on your face  If your skin gets too dry, you can apply a light (non-comedogenic) moisturizer on top of your medicine or you may switch to using the medicine every other day instead of every day  If your skin is still too irritated, you may need to switch to a milder medication  If your skin is red and very itchy, you may be allergic to the medication and you should stop using it        COMMON POSSIBLE SIDE EFFECTS OF MEDICATIONS     Retinoids - dryness, redness, increased sun sensitivity   Benzoyl peroxide - drying, redness, bleaching of clothes, towels and sheets, allergy   Doxycycline - headaches; dizziness; irritation of the throat; nail changes; discoloration of teeth   Sun sensitivity - even if you have dark skin, this medicine can make you burn more easily  Make sure you protect yourself from the sun, either by avoiding being outside between 11 AM and 3 PM, wearing and reapplying sunscreen/sunblock, or wearing sun protective clothing   Nausea/vomiting - if you experience nausea with this medication, take it with food   Minocycline - headaches; dizziness; vision problems,  irritation of the throat; discoloration of scars, gums, or teeth  Can rarely cause liver disease, joint pains, and flu-like symptoms   If you should notice yellowing of the skin or any of the above, notify your doctor and stop using the medication   Birth Control Pills - nausea; headaches; breast tenderness; feeling bloated; mood changes   Spotting between periods may occur for the first three weeks of the medication, but this is not serious  It may last for two or three cycles  Please call us if the bleeding is heavier than a light flow or lasts for more than a few days  WHEN AND WHERE TO CALL WITH CONCERNS  We are here to help! If you experience any unusual symptoms, then stop taking or using the medication and call our office at [phone])  It is better to be safe than to be sorry! Verruca Vulgaris  Based on a thorough discussion of this condition and the management approach to it (including a comprehensive discussion of the known risks, side effects and potential benefits of treatment), the patient (family) agrees to implement the following specific plan:   Cryotherapy today       A verruca is a common growth of the skin caused by infection by human papilloma virus (HPV)   There are many strains of the virus that cause different types of warts on the body  The virus infects the most superficial layers of the skin, causing increased production of skin cells and thickening  Warts can be spread through direct contact with infected skin and may spread to other parts of the body if scratched or picked  A verruca is more commonly called a "wart " Warts are particularly common in school-aged children but can arise at any age  Patients who have a history of eczema are especially prone due to impaired skin barrier  Those taking immunosuppressive drugs or with HIV infections may experience prolonged symptoms despite treatment  Warts generally have a rough surface with a tiny black dot sometimes observed in the middle of each scaly spot  They can range in size from a small bump to large scaly growths  Common warts are often found on the backs of fingers or toes, around the nails, and on the knees  Plantar warts can grow inwardly on the soles of the feet causing pain  There are many possible ways to treat warts and sometimes several different treatments are needed to get the warts to go away completely  There is no single perfect treatment for warts, and successful treatment can take many months  In-office treatments usually require multiple visits, and include:  1) Cryotherapy  a cold spray with liquid nitrogen will destroy the infected cells but may lead to discomfort and blistering  It may also leave a permanent white francis or scar  2) Electrosurgery (curettage and cautery) can be used for large resistant warts which involves shaving the growth down and burning the base  It is performed under local anesthesia and may leave a permanent scar    3) Candida (yeast) antigen injections  These are extracts of the common yeast (Candida) that cannot cause an infection  The medication is injected into/under the wart  It is thought to stimulate the immune system to recognize the wart virus and attack it  Multiple injections are often needed about one month apart  There are also several at-home wart treatments:    1) Soak the warts in warm water for 5 minutes every night followed by gentle filing with a nail file or pumice stone  2) Topical salicylic acid or similar compounds work by removing the dead surface skin cells  a  Apply the medicine directly to the wart, wait for it to dry completely, then cover with duct tape overnight   b  Repeat until the wart is gone, which can take 2-4 months  c  Do not use on the face or groin area   d  If the wart paint makes the skin sore, stop treatment until the discomfort has settled, then recommence as above   e  Take care to keep the chemical off normal skin  3) Podophyllin is a cytotoxic agent used in some products and must not be used in pregnancy or women considering pregnancy  4) Some prescription medications include   a  Topical retinoids (adapalene, tretinoin, tazarotene), 5-fluorouracil (Efudex) or imiquimod (Aldara) creams are sometimes used to treat flat warts or warts on the face and other sensitive anatomical areas  They are usually applied directly to the warts once a day for 2-4 months and can be irritating  These treatments should only be used as directed by your health care provider  b  Systemic treatment with oral cimetidine (Tagamet) may help boost the immune system against the wart virus in patients, some of the time  Initiation of cimetidine therapy should ONLY be done under the supervision of your health care provider, who can discuss possible side effects and drug-to-drug interactions of this specific treatment

## 2020-02-25 NOTE — H&P (VIEW-ONLY)
Assessment/Plan:    Based upon the history given and the current physical findings, I have recommended that the patient undergo septoplasty  All risks, benefits, alternatives, and complications have been reviewed in detail  The risks of the surgery include but are not limited to:  bleeding, infection, need for further surgery, continued septal deformity, nasal congestion, septal hematoma (collection of blood between septal flaps), and septal perforation (may cause whistling and bleeding)  The patient / parent understands and accepts all risks of the surgery  Pre-operative labs have been ordered  Medical clearance is not necessary  Post-operative medications have been prescribed and the patient / parent has been instructed to fill the medication in preparation for the surgery  Post operative instructions have been reviewed and a copy has been given to the patient / parent  A post operative appointment has been made for the patient  If any questions should arise prior to or after the surgery, the patient / parent should call my office and I will be glad to discuss any questions or concerns with them  Based upon the history given and the current physical findings, I have recommended that the patient undergo partial inferior turbinate reduction and out fracture  All risks, benefits, alternatives, and complications have been reviewed in detail  The risks of the surgery include but are not limited to: bleeding, infection, need for further surgery or repeat procedure, continued hypertrophy, intranasal synechiae formation, polyp formation, and nasal congestion  The patient / parent understands and accepts all risks of the surgery  Pre-operative labs have been ordered  Medical clearance is not necessary  Post-operative medications have been prescribed and the patient / parent has been instructed to fill the medication in preparation for the surgery    Post operative instructions have been reviewed and a copy has been provided to the patient / parent  A post operative appointment has been made for the patient  If any questions should arise prior to or after the surgery, the patient should call my office and I will be glad to discuss any questions or concerns with them  Encounter Diagnosis     ICD-10-CM    1  Nasal congestion R09 81    2  Deviated nasal septum J34 2 oxyCODONE-acetaminophen (PERCOCET) 5-325 mg per tablet     cephalexin (KEFLEX) 500 mg capsule   3  Hypertrophy of both inferior nasal turbinates J34 3               Patient ID: Bridget Nicole is a 34 y o  female  Uzma Sosa was last seen by me approximately 17 months ago  At that time she was scheduled for septoplasty and partial inferior turbinate reduction with out-fracture  She had a history of chronic nasal congestion and had failed long-term therapy with nasal steroid sprays  Physical examination demonstrated hypertrophy of the turbinates and a large spur of the septum with impingement into the inferior turbinate  She canceled the surgery because she became pregnant and had a baby  She is not currently breast feeding  Her symptoms have remained the same  The following portions of the patient's history were reviewed and updated as appropriate: allergies, current medications, past family history, past medical history, past social history, past surgical history and problem list       No past medical history on file      Past Surgical History:   Procedure Laterality Date    CYST REMOVAL      coccyx area - age 12       Social History     Tobacco Use    Smoking status: Never Smoker    Smokeless tobacco: Never Used   Substance Use Topics    Alcohol use: Not Currently    Drug use: Never       Current Outpatient Medications on File Prior to Visit   Medication Sig Dispense Refill    clindamycin (CLEOCIN T) 1 % external solution Apply topically to face after wash (Patient not taking: Reported on 2/27/2020) 60 mL 6     No current facility-administered medications on file prior to visit  No Known Allergies        Review of Systems   Constitutional: Negative for activity change, appetite change, fatigue, fever and unexpected weight change  HENT: Positive for congestion  Negative for ear discharge, ear pain, hearing loss, nosebleeds, postnasal drip, rhinorrhea, sinus pressure, sinus pain, sneezing, sore throat, tinnitus, trouble swallowing and voice change  Eyes: Negative  Negative for photophobia, pain, itching and visual disturbance  Respiratory: Negative  Negative for cough, chest tightness and shortness of breath  Cardiovascular: Negative  Negative for chest pain  Gastrointestinal: Negative  Negative for abdominal pain  Endocrine: Negative  Musculoskeletal: Negative  Negative for gait problem, neck pain and neck stiffness  Skin: Negative  Allergic/Immunologic: Negative  Negative for environmental allergies  Neurological: Negative  Negative for dizziness, speech difficulty, light-headedness and headaches  Hematological: Negative  Negative for adenopathy  Psychiatric/Behavioral: Negative  Negative for sleep disturbance  The patient is not nervous/anxious  /76   Pulse 68   Ht 5' 7" (1 702 m)   Wt 102 kg (224 lb)   BMI 35 08 kg/m²       PHYSICAL  EXAMINATION    CONSTITUTION:    Appears appropriate for age  No evidence of any acute distress  Communicates normally  Voice quality is clear  Alert and oriented  HEAD/FACE:    Atraumatic, normocephalic on inspection  No scars present  Salivary glands are normal in texture and size without any asymmetry  Facial nerve function is symmetric and normal     EYES:    Extraocular muscles intact in both eyes, normal gaze bilaterally and no evidence of nystagmus  Pupils equal, round, and accommodate to light bilaterally  EARS:    External ears normal     External canals are clear and dry      Tympanic membranes intact with normal mobility, no effusion, no retraction, no perforation  Post auricular area is normal    NOSE:    External nose without deformity  Internal mucosa pink and moist     Septum with left sided deviation - impinges into the inferior turbinate  Inferior nasal turbinates normal in color and with hypertrophy bilaterally    ORAL CAVITY:    Lips normal and healthy in appearance  Dentition normal     Gums healthy, pink and moist     Tongue appears pink and moist with no lesions  Floor of mouth pink, moist, and smooth  Submandibular ducts patent with clear saliva  Parotid ducts patent with clear saliva  Oral mucosa pink and moist     Hard palate normal in appearance without any lesions  OROPHARYNX:    Soft palate pink and moist without any lesions  Uvula midline without any lesions  Tonsils grade 1 bilaterally  Posterior pharynx pink and moist without any lesions    NECK:    Supple and symmetric  No masses noted  Trachea midline  No thyromegaly or nodules noted  LYMPH:    No palpable adenopathy in left or right neck    SKIN:    No rashes  No lesions noted       HEART:   Regular rate and rhythm    LUNGS:  Clear to auscultation bilaterally

## 2020-02-27 PROBLEM — J34.3 HYPERTROPHY OF BOTH INFERIOR NASAL TURBINATES: Status: ACTIVE | Noted: 2020-02-27

## 2020-02-27 PROBLEM — R09.81 NASAL CONGESTION: Status: ACTIVE | Noted: 2020-02-27

## 2020-02-27 PROBLEM — Z01.812 PRE-OPERATIVE LABORATORY EXAMINATION: Status: ACTIVE | Noted: 2020-02-27

## 2020-02-27 PROBLEM — Z01.818 PREOPERATIVE TESTING: Status: ACTIVE | Noted: 2020-02-27

## 2020-02-27 PROBLEM — J34.2 DEVIATED NASAL SEPTUM: Status: ACTIVE | Noted: 2020-02-27

## 2020-03-10 RX ORDER — MULTIVITAMIN
1 TABLET ORAL DAILY
COMMUNITY

## 2020-03-10 NOTE — PRE-PROCEDURE INSTRUCTIONS
Pre-Surgery Instructions:   Medication Instructions    Multiple Vitamin (MULTIVITAMIN) tablet Instructed patient per Anesthesia Guidelines   Oxymetazoline HCl (MUCINEX NASAL SPRAY MOISTURE NA) Instructed patient per Anesthesia Guidelines  Patient given/ instructed on use of chlorhexidine soap per hospital protocol    Patient instructed to stop all ASA, NSAIDS, vitamins and herbal supplements one week prior to surgery or per Dr Ascencion Rodriguez

## 2020-03-16 ENCOUNTER — ANESTHESIA EVENT (OUTPATIENT)
Dept: PERIOP | Facility: HOSPITAL | Age: 30
End: 2020-03-16
Payer: COMMERCIAL

## 2020-03-17 ENCOUNTER — ANESTHESIA (OUTPATIENT)
Dept: PERIOP | Facility: HOSPITAL | Age: 30
End: 2020-03-17
Payer: COMMERCIAL

## 2020-03-17 ENCOUNTER — HOSPITAL ENCOUNTER (OUTPATIENT)
Facility: HOSPITAL | Age: 30
Setting detail: OUTPATIENT SURGERY
Discharge: HOME | End: 2020-03-17
Attending: OTOLARYNGOLOGY | Admitting: OTOLARYNGOLOGY
Payer: COMMERCIAL

## 2020-03-17 VITALS
DIASTOLIC BLOOD PRESSURE: 76 MMHG | SYSTOLIC BLOOD PRESSURE: 121 MMHG | RESPIRATION RATE: 16 BRPM | HEART RATE: 82 BPM | WEIGHT: 225 LBS | BODY MASS INDEX: 35.31 KG/M2 | OXYGEN SATURATION: 95 % | TEMPERATURE: 98.4 F | HEIGHT: 67 IN

## 2020-03-17 DIAGNOSIS — Z01.818 PREOPERATIVE TESTING: ICD-10-CM

## 2020-03-17 DIAGNOSIS — J34.3 HYPERTROPHY OF BOTH INFERIOR NASAL TURBINATES: ICD-10-CM

## 2020-03-17 DIAGNOSIS — R09.81 NASAL CONGESTION: ICD-10-CM

## 2020-03-17 DIAGNOSIS — Z01.812 PRE-OPERATIVE LABORATORY EXAMINATION: ICD-10-CM

## 2020-03-17 DIAGNOSIS — J34.2 DEVIATED NASAL SEPTUM: ICD-10-CM

## 2020-03-17 LAB
EXT PREGNANCY TEST URINE: NEGATIVE
EXT. CONTROL: NORMAL

## 2020-03-17 PROCEDURE — 88300 SURGICAL PATH GROSS: CPT | Performed by: PATHOLOGY

## 2020-03-17 PROCEDURE — 30802 ABLATE INF TURBINATE SUBMUC: CPT | Performed by: OTOLARYNGOLOGY

## 2020-03-17 PROCEDURE — 81025 URINE PREGNANCY TEST: CPT | Performed by: ANESTHESIOLOGY

## 2020-03-17 PROCEDURE — 30520 REPAIR OF NASAL SEPTUM: CPT | Performed by: OTOLARYNGOLOGY

## 2020-03-17 PROCEDURE — 30930 THER FX NASAL INF TURBINATE: CPT | Performed by: OTOLARYNGOLOGY

## 2020-03-17 RX ORDER — GLYCOPYRROLATE 0.2 MG/ML
INJECTION INTRAMUSCULAR; INTRAVENOUS AS NEEDED
Status: DISCONTINUED | OUTPATIENT
Start: 2020-03-17 | End: 2020-03-17 | Stop reason: SURG

## 2020-03-17 RX ORDER — PROPOFOL 10 MG/ML
INJECTION, EMULSION INTRAVENOUS AS NEEDED
Status: DISCONTINUED | OUTPATIENT
Start: 2020-03-17 | End: 2020-03-17 | Stop reason: SURG

## 2020-03-17 RX ORDER — ONDANSETRON 2 MG/ML
INJECTION INTRAMUSCULAR; INTRAVENOUS AS NEEDED
Status: DISCONTINUED | OUTPATIENT
Start: 2020-03-17 | End: 2020-03-17 | Stop reason: SURG

## 2020-03-17 RX ORDER — FENTANYL CITRATE/PF 50 MCG/ML
25 SYRINGE (ML) INJECTION
Status: DISCONTINUED | OUTPATIENT
Start: 2020-03-17 | End: 2020-03-17 | Stop reason: HOSPADM

## 2020-03-17 RX ORDER — LIDOCAINE HYDROCHLORIDE AND EPINEPHRINE 10; 10 MG/ML; UG/ML
INJECTION, SOLUTION INFILTRATION; PERINEURAL AS NEEDED
Status: DISCONTINUED | OUTPATIENT
Start: 2020-03-17 | End: 2020-03-17 | Stop reason: HOSPADM

## 2020-03-17 RX ORDER — DEXAMETHASONE SODIUM PHOSPHATE 10 MG/ML
INJECTION, SOLUTION INTRAMUSCULAR; INTRAVENOUS AS NEEDED
Status: DISCONTINUED | OUTPATIENT
Start: 2020-03-17 | End: 2020-03-17 | Stop reason: SURG

## 2020-03-17 RX ORDER — OXYCODONE HYDROCHLORIDE AND ACETAMINOPHEN 5; 325 MG/1; MG/1
2 TABLET ORAL EVERY 4 HOURS PRN
Status: DISCONTINUED | OUTPATIENT
Start: 2020-03-17 | End: 2020-03-17 | Stop reason: HOSPADM

## 2020-03-17 RX ORDER — SODIUM CHLORIDE/ALOE VERA
GEL (GRAM) NASAL AS NEEDED
Status: DISCONTINUED | OUTPATIENT
Start: 2020-03-17 | End: 2020-03-17 | Stop reason: HOSPADM

## 2020-03-17 RX ORDER — SODIUM CHLORIDE 9 MG/ML
125 INJECTION, SOLUTION INTRAVENOUS CONTINUOUS
Status: DISCONTINUED | OUTPATIENT
Start: 2020-03-17 | End: 2020-03-17 | Stop reason: HOSPADM

## 2020-03-17 RX ORDER — ONDANSETRON 2 MG/ML
4 INJECTION INTRAMUSCULAR; INTRAVENOUS ONCE AS NEEDED
Status: DISCONTINUED | OUTPATIENT
Start: 2020-03-17 | End: 2020-03-17 | Stop reason: HOSPADM

## 2020-03-17 RX ORDER — NEOSTIGMINE METHYLSULFATE 1 MG/ML
INJECTION INTRAVENOUS AS NEEDED
Status: DISCONTINUED | OUTPATIENT
Start: 2020-03-17 | End: 2020-03-17 | Stop reason: SURG

## 2020-03-17 RX ORDER — MIDAZOLAM HYDROCHLORIDE 2 MG/2ML
INJECTION, SOLUTION INTRAMUSCULAR; INTRAVENOUS AS NEEDED
Status: DISCONTINUED | OUTPATIENT
Start: 2020-03-17 | End: 2020-03-17 | Stop reason: SURG

## 2020-03-17 RX ORDER — FENTANYL CITRATE 50 UG/ML
INJECTION, SOLUTION INTRAMUSCULAR; INTRAVENOUS AS NEEDED
Status: DISCONTINUED | OUTPATIENT
Start: 2020-03-17 | End: 2020-03-17 | Stop reason: SURG

## 2020-03-17 RX ORDER — LIDOCAINE HYDROCHLORIDE 10 MG/ML
INJECTION, SOLUTION EPIDURAL; INFILTRATION; INTRACAUDAL; PERINEURAL AS NEEDED
Status: DISCONTINUED | OUTPATIENT
Start: 2020-03-17 | End: 2020-03-17 | Stop reason: SURG

## 2020-03-17 RX ORDER — ONDANSETRON 2 MG/ML
4 INJECTION INTRAMUSCULAR; INTRAVENOUS EVERY 6 HOURS PRN
Status: DISCONTINUED | OUTPATIENT
Start: 2020-03-17 | End: 2020-03-17 | Stop reason: HOSPADM

## 2020-03-17 RX ORDER — GINSENG 100 MG
CAPSULE ORAL AS NEEDED
Status: DISCONTINUED | OUTPATIENT
Start: 2020-03-17 | End: 2020-03-17 | Stop reason: HOSPADM

## 2020-03-17 RX ORDER — ACETAMINOPHEN 325 MG/1
650 TABLET ORAL EVERY 4 HOURS PRN
Status: DISCONTINUED | OUTPATIENT
Start: 2020-03-17 | End: 2020-03-17 | Stop reason: HOSPADM

## 2020-03-17 RX ORDER — MAGNESIUM HYDROXIDE 1200 MG/15ML
LIQUID ORAL AS NEEDED
Status: DISCONTINUED | OUTPATIENT
Start: 2020-03-17 | End: 2020-03-17 | Stop reason: HOSPADM

## 2020-03-17 RX ORDER — HYDROMORPHONE HCL 110MG/55ML
PATIENT CONTROLLED ANALGESIA SYRINGE INTRAVENOUS AS NEEDED
Status: DISCONTINUED | OUTPATIENT
Start: 2020-03-17 | End: 2020-03-17 | Stop reason: SURG

## 2020-03-17 RX ORDER — DEXTROSE MONOHYDRATE AND SODIUM CHLORIDE 5; .45 G/100ML; G/100ML
125 INJECTION, SOLUTION INTRAVENOUS CONTINUOUS
Status: DISCONTINUED | OUTPATIENT
Start: 2020-03-17 | End: 2020-03-17 | Stop reason: HOSPADM

## 2020-03-17 RX ORDER — ROCURONIUM BROMIDE 10 MG/ML
INJECTION, SOLUTION INTRAVENOUS AS NEEDED
Status: DISCONTINUED | OUTPATIENT
Start: 2020-03-17 | End: 2020-03-17 | Stop reason: SURG

## 2020-03-17 RX ADMIN — SODIUM CHLORIDE 125 ML/HR: 0.9 INJECTION, SOLUTION INTRAVENOUS at 06:57

## 2020-03-17 RX ADMIN — LIDOCAINE HYDROCHLORIDE 100 MG: 10 INJECTION, SOLUTION EPIDURAL; INFILTRATION; INTRACAUDAL; PERINEURAL at 08:52

## 2020-03-17 RX ADMIN — FENTANYL CITRATE 25 MCG: 50 INJECTION, SOLUTION INTRAMUSCULAR; INTRAVENOUS at 10:15

## 2020-03-17 RX ADMIN — ROCURONIUM BROMIDE 30 MG: 50 INJECTION, SOLUTION INTRAVENOUS at 08:52

## 2020-03-17 RX ADMIN — SODIUM CHLORIDE: 0.9 INJECTION, SOLUTION INTRAVENOUS at 09:10

## 2020-03-17 RX ADMIN — GLYCOPYRROLATE 0.4 MG: 0.2 INJECTION INTRAMUSCULAR; INTRAVENOUS at 09:46

## 2020-03-17 RX ADMIN — SODIUM CHLORIDE: 0.9 INJECTION, SOLUTION INTRAVENOUS at 08:45

## 2020-03-17 RX ADMIN — MIDAZOLAM 2 MG: 1 INJECTION INTRAMUSCULAR; INTRAVENOUS at 08:45

## 2020-03-17 RX ADMIN — GLYCOPYRROLATE 0.2 MG: 0.2 INJECTION INTRAMUSCULAR; INTRAVENOUS at 08:59

## 2020-03-17 RX ADMIN — DEXAMETHASONE SODIUM PHOSPHATE 8 MG: 10 INJECTION, SOLUTION INTRAMUSCULAR; INTRAVENOUS at 09:00

## 2020-03-17 RX ADMIN — PROPOFOL 200 MG: 10 INJECTION, EMULSION INTRAVENOUS at 08:52

## 2020-03-17 RX ADMIN — FENTANYL CITRATE 50 MCG: 50 INJECTION, SOLUTION INTRAMUSCULAR; INTRAVENOUS at 09:00

## 2020-03-17 RX ADMIN — FENTANYL CITRATE 25 MCG: 50 INJECTION, SOLUTION INTRAMUSCULAR; INTRAVENOUS at 10:10

## 2020-03-17 RX ADMIN — NEOSTIGMINE METHYLSULFATE 3 MG: 1 INJECTION, SOLUTION INTRAVENOUS at 09:46

## 2020-03-17 RX ADMIN — ONDANSETRON 4 MG: 2 INJECTION INTRAMUSCULAR; INTRAVENOUS at 09:05

## 2020-03-17 RX ADMIN — HYDROMORPHONE HYDROCHLORIDE 0.5 MG: 2 INJECTION, SOLUTION INTRAMUSCULAR; INTRAVENOUS; SUBCUTANEOUS at 09:00

## 2020-03-17 RX ADMIN — FENTANYL CITRATE 50 MCG: 50 INJECTION, SOLUTION INTRAMUSCULAR; INTRAVENOUS at 08:52

## 2020-03-17 NOTE — DISCHARGE INSTRUCTIONS
[facility]    POST OPERATIVE SEPTOPLASTY/TURBINATE REDUCTION SURGERY INTRUCTIONS      1  Change drip pad under the nose as needed for bleeding  2  Keep head of bed elevated  Sleep on at least a few pillows at night  3  Expect and do not become concerned about not being able to breathe through your nose  You will be a mouth-breather for approximately one week  4  You may cleanse crusting from the anterior portion of your nose with hydrogen peroxide and Q-tips  You may use Ocean nasal spray throughout the day to prevent crusting inside nasal cavity and splints  5  Begin using sinus rinse two times daily  If you have nasal splints in place you may start after the nasal splints are removed  6  Do not blow your nose until exactly two weeks after surgery  7  If you must sneeze, sneeze with mouth open  8  Avoid any strenuous activity, exercise, bending, or lifting, until approved by your surgeon  9  If there is significant bleeding, you may use over-the-counter Afrin  Place 2 sprays into the bleeding nostril every 5 minutes up to 3 consecutive times  If bleeding does not stop, call our office at [phone] or [phone] or go directly to the emergency room  10  If you have severe pain which is uncontrolled by medication or a fever greater than 101°F, notify our office immediately at [phone] or [phone]  11   If you have a prescription for pain medication follow appropriate directions on label  If no prescription was given you may take over the counter pain medication as needed  12   If you have a prescription for steroids (Prednisone, Prednisolone) you may begin taking the medication the day following the surgical procedure  13  No smoking  Avoid second hand smoke exposure

## 2020-03-17 NOTE — ANESTHESIA PREPROCEDURE EVALUATION
Review of Systems/Medical History  Patient summary reviewed    No history of anesthetic complications     Cardiovascular  Negative cardio ROS    Pulmonary  Negative pulmonary ROS        GI/Hepatic  Negative GI/hepatic ROS          Negative  ROS        Endo/Other  Negative endo/other ROS   Obesity    GYN  Negative gynecology ROS          Hematology  Negative hematology ROS      Musculoskeletal  Negative musculoskeletal ROS        Neurology    Headaches,    Psychology   Negative psychology ROS              Physical Exam    Airway    Mallampati score: III  TM Distance: >3 FB  Neck ROM: full     Dental   No notable dental hx     Cardiovascular  Comment: Negative ROS, Cardiovascular exam normal    Pulmonary  Pulmonary exam normal     Other Findings        Anesthesia Plan  ASA Score- 2     Anesthesia Type- general with ASA Monitors  Additional Monitors:   Airway Plan: ETT  Plan Factors-    Induction- intravenous  Postoperative Plan-     Informed Consent- Anesthetic plan and risks discussed with patient

## 2020-03-17 NOTE — OP NOTE
OPERATIVE REPORT  PATIENT NAME: Dalton Albarran    :  1990  MRN: [id number]  Pt Location: [facility] OR ROOM 08    SURGERY DATE: 3/17/2020    Surgeon(s) and Role:     * Kaitlin Hennessy DO - Primary    Preop Diagnosis:  Nasal congestion [R09 81]  Deviated nasal septum [J34 2]  Hypertrophy of both inferior nasal turbinates [J34 3]  Pre-operative laboratory examination [Z01 812]  Preoperative testing [Z01 818]    Post-Op Diagnosis Codes:     * Nasal congestion [R09 81]     * Deviated nasal septum [J34 2]     * Hypertrophy of both inferior nasal turbinates [J34 3]     * Pre-operative laboratory examination [Z01 812]     * Preoperative testing [Z01 818]    Procedure(s) (LRB):  SEPTOPLASTY (N/A)  PARTIAL INFERIOR TURBINATE REDUCTION AND OUT FRACTURE (N/A)    Specimen(s):  ID Type Source Tests Collected by Time Destination   1 : Septal cartilage and Bone Tissue Nasal Septum TISSUE EXAM Kaitlin Hennessy DO 3/17/2020 0909        Estimated Blood Loss:   Minimal    Drains:  * No LDAs found *    Anesthesia Type:   General    Operative Indications:  Nasal congestion [R09 81]  Deviated nasal septum [J34 2]  Hypertrophy of both inferior nasal turbinates [J34 3]  Pre-operative laboratory examination [Z01 812]  Preoperative testing [Z01 818]      Operative Findings:  Significant hypertrophy of each inferior turbinate causing complete obstruction  Significant deviation of the septum to the left with spur formation    Complications:   None    Procedure and Technique:  Patient was identified in the holding area  She was taken to the OR and placed on the OR table in supine position  She was placed under general anesthesia in the supine position  She was prepped and draped in the usual fashion for the above surgery and a formal time-out was obtained  Once all information was noted to be correct cocaine soaked cotton pledgets x1 were placed in each nasal cavity    I was unable to fit a 2nd cotton pledget in due to the significant hypertrophy of the turbinates and the significant deflection of the septum to the left  After approximately 45 minutes these were removed and I injected 1% xylocaine with 1 100,000 epinephrine into the mucoperichondrial flaps bilaterally as well as the inferior turbinates  Following this I was able to place 2 pledgets back in each nasal cavity for another 3-4 minutes and then they were removed  I started by performing Coblation of the right inferior turbinate since this was more easily visible  Three submucosal tunnels were created in each inferior turbinate holding each marking point in place for approximately 8-10 seconds  Upon removal and creation of 3 tunnels outfracture was completed using a large nasal speculum  Septoplasty was completed next making an incision in the left mucoperichondrial membrane and elevating the mucoperichondrial flap above the cartilage  There were 2 deflections  The 1st was on the floor of the nose and the 2nd was high up in the nasal vault and towards the mid nasal septum  Each of these were removed by using a D knife and incising the cartilage either above or anterior to the deflection and raising the flap on the opposite side  Pieces of cartilage and or bone were removed using a combination of instruments  Once the deflections were removed I was now able to see the posterior portion of the inferior turbinate on the left side  This point Coblation was completed as it was on the opposite side making 3 submucosal tunnels once again  There was a rather large piece of bone within the turbinate which was left in place but lateralized  At the completion there was a tear on the left septal flap but the right side was completely intact  The incision was closed with interrupted 4 0 chromic suture  Antibiotic coated Chen splints were placed on each nasal cavity  On the left side and made sure to reapproximate the torn edges  This was sutured in place with a 2 0 nylon suture  At this point she was woken, extubated, and taken to recovery in stable condition   I was present for the entire procedure    Patient Disposition:  PACU     SIGNATURE: Dell Agee DO  DATE: March 17, 2020  TIME: 9:51 AM

## 2020-03-17 NOTE — INTERVAL H&P NOTE
H&P reviewed  After examining the patient I find no changes in the patients condition since the H&P had been written      Vitals:    03/17/20 0644   BP: 119/74   Pulse: 74   Resp: 16   Temp: 97 6 °F (36 4 °C)   SpO2: 99%

## 2020-04-08 ENCOUNTER — TELEPHONE (OUTPATIENT)
Dept: DERMATOLOGY | Facility: CLINIC | Age: 30
End: 2020-04-08

## (undated) DEVICE — INTENDED FOR TISSUE SEPARATION, AND OTHER PROCEDURES THAT REQUIRE A SHARP SURGICAL BLADE TO PUNCTURE OR CUT.: Brand: BARD-PARKER ® CARBON RIB-BACK BLADES

## (undated) DEVICE — SUT ETHILON 2-0 FS 18 IN 664H

## (undated) DEVICE — NEEDLE 25G X 1 1/2

## (undated) DEVICE — 2000CC GUARDIAN II: Brand: GUARDIAN

## (undated) DEVICE — SPLINT 1524050 5PK PAIR DOYLE II AIRWAY: Brand: DOYLE II ™

## (undated) DEVICE — SUT CHROMIC 4-0 PS-4 18 IN 1643G

## (undated) DEVICE — NEURO PATTIES 1/2 X 3

## (undated) DEVICE — ASTOUND STANDARD SURGICAL GOWN, XXL: Brand: CONVERTORS

## (undated) DEVICE — GLOVE INDICATOR PI UNDERGLOVE SZ 6.5 BLUE

## (undated) DEVICE — GLOVE SRG BIOGEL ORTHOPEDIC 7

## (undated) DEVICE — STANDARD SURGICAL GOWN, L: Brand: CONVERTORS

## (undated) DEVICE — SCD SEQUENTIAL COMPRESSION COMFORT SLEEVE MEDIUM KNEE LENGTH: Brand: KENDALL SCD

## (undated) DEVICE — WAND COBLATION REFLEX ULTRA 45

## (undated) DEVICE — GLOVE SRG BIOGEL ECLIPSE 7

## (undated) DEVICE — NEEDLE 18 G X 1 1/2

## (undated) DEVICE — GLOVE INDICATOR PI UNDERGLOVE SZ 7.5 BLUE

## (undated) DEVICE — GLOVE SRG BIOGEL 6

## (undated) DEVICE — STERILE NASAL PACK: Brand: CARDINAL HEALTH